# Patient Record
Sex: MALE | Race: BLACK OR AFRICAN AMERICAN | NOT HISPANIC OR LATINO | URBAN - METROPOLITAN AREA
[De-identification: names, ages, dates, MRNs, and addresses within clinical notes are randomized per-mention and may not be internally consistent; named-entity substitution may affect disease eponyms.]

---

## 2022-04-30 ENCOUNTER — TELEPHONE ENCOUNTER (OUTPATIENT)
Dept: URBAN - METROPOLITAN AREA CLINIC 121 | Facility: CLINIC | Age: 72
End: 2022-04-30

## 2022-05-01 ENCOUNTER — TELEPHONE ENCOUNTER (OUTPATIENT)
Dept: URBAN - METROPOLITAN AREA CLINIC 121 | Facility: CLINIC | Age: 72
End: 2022-05-01

## 2022-05-01 RX ORDER — TEA TREE OIL 100 %
SPRAY, NON-AEROSOL (ML) TOPICAL
Status: ACTIVE | COMMUNITY
Start: 2012-08-14

## 2022-05-01 RX ORDER — LISINOPRIL AND HYDROCHLOROTHIAZIDE TABLETS 10; 12.5 MG/1; MG/1
TABLET ORAL
Status: ACTIVE | COMMUNITY
Start: 2012-08-14

## 2022-05-01 RX ORDER — POLYETHYLENE GLYCOL 3350, SODIUM SULFATE, SODIUM CHLORIDE, POTASSIUM CHLORIDE, ASCORBIC ACID, SODIUM ASCORBATE 7.5-2.691G
MIX AND USE AS DIRECTED KIT ORAL
Status: ACTIVE | COMMUNITY
Start: 2012-08-14

## 2022-05-01 RX ORDER — CLOPIDOGREL BISULFATE 75 MG
TABLET ORAL
Status: ACTIVE | COMMUNITY
Start: 2012-08-14

## 2023-11-09 ENCOUNTER — OFFICE VISIT (OUTPATIENT)
Dept: URBAN - METROPOLITAN AREA CLINIC 29 | Facility: CLINIC | Age: 73
End: 2023-11-09
Payer: COMMERCIAL

## 2023-11-09 ENCOUNTER — LAB OUTSIDE AN ENCOUNTER (OUTPATIENT)
Dept: URBAN - METROPOLITAN AREA CLINIC 29 | Facility: CLINIC | Age: 73
End: 2023-11-09

## 2023-11-09 VITALS
HEART RATE: 78 BPM | BODY MASS INDEX: 35.42 KG/M2 | SYSTOLIC BLOOD PRESSURE: 165 MMHG | WEIGHT: 276 LBS | HEIGHT: 74 IN | DIASTOLIC BLOOD PRESSURE: 85 MMHG

## 2023-11-09 DIAGNOSIS — I10 ESSENTIAL (PRIMARY) HYPERTENSION: ICD-10-CM

## 2023-11-09 DIAGNOSIS — Z12.11 ENCOUNTER FOR SCREENING FOR MALIGNANT NEOPLASM OF COLON: ICD-10-CM

## 2023-11-09 DIAGNOSIS — E11.9 TYPE 2 DIABETES MELLITUS WITHOUT COMPLICATION, WITHOUT LONG-TERM CURRENT USE OF INSULIN: ICD-10-CM

## 2023-11-09 PROBLEM — 313436004: Status: ACTIVE | Noted: 2023-11-09

## 2023-11-09 PROCEDURE — 99204 OFFICE O/P NEW MOD 45 MIN: CPT | Performed by: INTERNAL MEDICINE

## 2023-11-09 RX ORDER — LISINOPRIL AND HYDROCHLOROTHIAZIDE TABLETS 10; 12.5 MG/1; MG/1
TABLET ORAL
Status: DISCONTINUED | COMMUNITY
Start: 2012-08-14

## 2023-11-09 RX ORDER — CLOPIDOGREL BISULFATE 75 MG
TABLET ORAL
Status: DISCONTINUED | COMMUNITY
Start: 2012-08-14

## 2023-11-09 RX ORDER — POLYETHYLENE GLYCOL 3350, SODIUM SULFATE, SODIUM CHLORIDE, POTASSIUM CHLORIDE, ASCORBIC ACID, SODIUM ASCORBATE 7.5-2.691G
MIX AND USE AS DIRECTED KIT ORAL
Status: DISCONTINUED | COMMUNITY
Start: 2012-08-14

## 2023-11-09 RX ORDER — SOD SULF/POT CHLORIDE/MAG SULF 1.479 G
12 TABLETS THE FIRST DOSE THE EVENING BEFORE AND SECOND DOSE THE MORNING OF COLONOSCOPY TABLET ORAL TWICE A DAY
Qty: 24 TABLET | OUTPATIENT
Start: 2023-11-09 | End: 2023-11-10

## 2023-11-09 RX ORDER — TEA TREE OIL 100 %
SPRAY, NON-AEROSOL (ML) TOPICAL
Status: DISCONTINUED | COMMUNITY
Start: 2012-08-14

## 2023-11-09 NOTE — HPI-TODAY'S VISIT:
Mr. Macias is a 73-year-old -American male who presents today per request by Dr. Robert for colon cancer screening.  He reports having a normal colonoscopy 12 years ago.  He denies any family history of colon cancer or polyps.  He denies any gastrointestinal issues.  His medical history is significant for hypertension, diabetes mellitus and coronary artery disease status post stent placement several years ago.

## 2023-11-14 ENCOUNTER — CLAIMS CREATED FROM THE CLAIM WINDOW (OUTPATIENT)
Dept: URBAN - METROPOLITAN AREA SURGERY CENTER 7 | Facility: SURGERY CENTER | Age: 73
End: 2023-11-14

## 2023-11-14 ENCOUNTER — CLAIMS CREATED FROM THE CLAIM WINDOW (OUTPATIENT)
Dept: URBAN - METROPOLITAN AREA SURGERY CENTER 7 | Facility: SURGERY CENTER | Age: 73
End: 2023-11-14
Payer: COMMERCIAL

## 2023-11-14 DIAGNOSIS — R19.5 STOOL CONTENTS FINDING, ABNORMAL: ICD-10-CM

## 2023-11-14 DIAGNOSIS — Z12.11 COLON CANCER SCREENING: ICD-10-CM

## 2023-11-14 DIAGNOSIS — Z53.8 FAILED ATTEMPTED SURGICAL PROCEDURE: ICD-10-CM

## 2023-11-14 DIAGNOSIS — Z12.11 COLON CANCER SCREENING (HIGH RISK): ICD-10-CM

## 2023-11-14 PROCEDURE — 00811 ANES LWR INTST NDSC NOS: CPT | Performed by: NURSE ANESTHETIST, CERTIFIED REGISTERED

## 2023-11-14 PROCEDURE — G0121 COLON CA SCRN NOT HI RSK IND: HCPCS | Performed by: INTERNAL MEDICINE

## 2023-11-14 PROCEDURE — G8907 PT DOC NO EVENTS ON DISCHARG: HCPCS | Performed by: INTERNAL MEDICINE

## 2023-11-28 ENCOUNTER — TELEPHONE ENCOUNTER (OUTPATIENT)
Dept: URBAN - METROPOLITAN AREA CLINIC 27 | Facility: CLINIC | Age: 73
End: 2023-11-28

## 2023-11-28 ENCOUNTER — CLAIMS CREATED FROM THE CLAIM WINDOW (OUTPATIENT)
Dept: URBAN - METROPOLITAN AREA SURGERY CENTER 7 | Facility: SURGERY CENTER | Age: 73
End: 2023-11-28
Payer: COMMERCIAL

## 2023-11-28 ENCOUNTER — LAB OUTSIDE AN ENCOUNTER (OUTPATIENT)
Dept: URBAN - METROPOLITAN AREA CLINIC 27 | Facility: CLINIC | Age: 73
End: 2023-11-28

## 2023-11-28 ENCOUNTER — CLAIMS CREATED FROM THE CLAIM WINDOW (OUTPATIENT)
Dept: URBAN - METROPOLITAN AREA CLINIC 4 | Facility: CLINIC | Age: 73
End: 2023-11-28
Payer: COMMERCIAL

## 2023-11-28 DIAGNOSIS — K52.3 COLITIS, INDETERMINATE: ICD-10-CM

## 2023-11-28 DIAGNOSIS — K63.89 APPENDICITIS EPIPLOICA: ICD-10-CM

## 2023-11-28 DIAGNOSIS — Z12.11 COLON CANCER SCREENING: ICD-10-CM

## 2023-11-28 DIAGNOSIS — K29.70 ERYTHEMATOUS GASTROPATHY: ICD-10-CM

## 2023-11-28 DIAGNOSIS — D12.2 ADENOMATOUS POLYP OF ASCENDING COLON: ICD-10-CM

## 2023-11-28 DIAGNOSIS — K52.3 INDETERMINATE COLITIS: ICD-10-CM

## 2023-11-28 DIAGNOSIS — K63.9 MUCOSAL ABNORMALITY OF COLON: ICD-10-CM

## 2023-11-28 DIAGNOSIS — Z12.11 COLON CANCER SCREENING (HIGH RISK): ICD-10-CM

## 2023-11-28 PROBLEM — 444548001: Status: ACTIVE | Noted: 2023-11-28

## 2023-11-28 PROCEDURE — G8907 PT DOC NO EVENTS ON DISCHARG: HCPCS | Performed by: INTERNAL MEDICINE

## 2023-11-28 PROCEDURE — 45380 COLONOSCOPY AND BIOPSY: CPT | Performed by: INTERNAL MEDICINE

## 2023-11-28 PROCEDURE — 00811 ANES LWR INTST NDSC NOS: CPT | Performed by: NURSE ANESTHETIST, CERTIFIED REGISTERED

## 2023-11-28 PROCEDURE — 88305 TISSUE EXAM BY PATHOLOGIST: CPT | Performed by: PATHOLOGY

## 2023-11-28 RX ORDER — CIPROFLOXACIN HYDROCHLORIDE 500 MG/1
1 TABLET TABLET, FILM COATED ORAL
Qty: 20 TABLET | OUTPATIENT
Start: 2023-11-28 | End: 2023-12-08

## 2023-11-28 RX ORDER — METRONIDAZOLE 500 MG/1
1 TABLET TABLET ORAL THREE TIMES A DAY
Qty: 30 TABLET | Refills: 0 | OUTPATIENT
Start: 2023-11-28 | End: 2023-12-08

## 2023-12-05 ENCOUNTER — TELEPHONE ENCOUNTER (OUTPATIENT)
Dept: URBAN - METROPOLITAN AREA CLINIC 27 | Facility: CLINIC | Age: 73
End: 2023-12-05

## 2023-12-07 ENCOUNTER — OFFICE VISIT (OUTPATIENT)
Dept: URBAN - METROPOLITAN AREA CLINIC 29 | Facility: CLINIC | Age: 73
End: 2023-12-07
Payer: COMMERCIAL

## 2023-12-07 VITALS
HEIGHT: 74 IN | HEART RATE: 64 BPM | SYSTOLIC BLOOD PRESSURE: 168 MMHG | DIASTOLIC BLOOD PRESSURE: 88 MMHG | BODY MASS INDEX: 35.55 KG/M2 | WEIGHT: 277 LBS

## 2023-12-07 DIAGNOSIS — K52.3 COLITIS, INDETERMINATE: ICD-10-CM

## 2023-12-07 PROCEDURE — 99214 OFFICE O/P EST MOD 30 MIN: CPT | Performed by: INTERNAL MEDICINE

## 2023-12-07 RX ORDER — CIPROFLOXACIN HYDROCHLORIDE 500 MG/1
1 TABLET TABLET, FILM COATED ORAL
Qty: 20 TABLET | Status: ACTIVE | COMMUNITY
Start: 2023-11-28 | End: 2023-12-08

## 2023-12-07 RX ORDER — MESALAMINE 1.2 G/1
2 TABLETS WITH A MEAL TABLET, DELAYED RELEASE ORAL TWICE DAILY
Qty: 120 TABLET | Refills: 5 | OUTPATIENT
Start: 2023-12-10 | End: 2024-06-07

## 2023-12-07 RX ORDER — METRONIDAZOLE 500 MG/1
1 TABLET TABLET ORAL THREE TIMES A DAY
Qty: 30 TABLET | Refills: 0 | Status: ACTIVE | COMMUNITY
Start: 2023-11-28 | End: 2023-12-08

## 2023-12-07 NOTE — HPI-TODAY'S VISIT:
Mr. Macias presents today to discuss his recent manage screening colonoscopy biopsy results.  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  He is currently asymptomatic.  Otherwise, he has no other GI complaints.

## 2023-12-08 LAB
CALPROTECTIN, FECAL: 15
CLOSTRIDIUM DIFFICILE: NOT DETECTED
LEUKOCYTES: (no result)
SALMONELLA AND SHIGELLA, CULTURE: (no result)
SHIGA TOXINS, EIA W/RFL TO E.COLI O157 CULTURE: (no result)
TOXIN A AND B: NOT DETECTED

## 2023-12-12 ENCOUNTER — LAB OUTSIDE AN ENCOUNTER (OUTPATIENT)
Dept: URBAN - METROPOLITAN AREA CLINIC 27 | Facility: CLINIC | Age: 73
End: 2023-12-12

## 2023-12-13 ENCOUNTER — LAB OUTSIDE AN ENCOUNTER (OUTPATIENT)
Dept: URBAN - METROPOLITAN AREA CLINIC 27 | Facility: CLINIC | Age: 73
End: 2023-12-13

## 2023-12-16 LAB
A/G RATIO: 1.8
ABSOLUTE BASOPHILS: 72
ABSOLUTE EOSINOPHILS: 242
ABSOLUTE LYMPHOCYTES: 2481
ABSOLUTE MONOCYTES: 473
ABSOLUTE NEUTROPHILS: 2233
ALBUMIN: 4.2
ALKALINE PHOSPHATASE: 155
ALT (SGPT): 20
ANCA SCREEN: (no result)
AST (SGOT): 21
ATYPICAL P ANCA TITER: (no result)
BASOPHILS: 1.3
BILIRUBIN, TOTAL: 0.6
BUN/CREATININE RATIO: (no result)
BUN: 13
C-REACTIVE PROTEIN, QUANT: 2.2
CALCIUM: 10.4
CARBON DIOXIDE, TOTAL: 29
CHLORIDE: 102
CREATININE: 0.93
EGFR: 88
EOSINOPHILS: 4.4
FERRITIN, SERUM: 86
GASCA: 30.2
GLOBULIN, TOTAL: 2.4
GLUCOSE: 82
HEMATOCRIT: 44.8
HEMOGLOBIN: 14.9
IRON BIND.CAP.(TIBC): 355
IRON SATURATION: 24
IRON: 86
LYMPHOCYTES: 45.1
MCH: 26.6
MCHC: 33.3
MCV: 80
MONOCYTES: 8.6
MPV: 10.5
MYELOPEROXIDASE ANTIBODY: <1
NEUTROPHILS: 40.6
PLATELET COUNT: 296
POTASSIUM: 4
PROTEIN, TOTAL: 6.6
PROTEINASE-3 ANTIBODY: <1
RDW: 13.8
RED BLOOD CELL COUNT: 5.6
SACCHAROMYCES CEREVISIAE AB (ASCA) (IGA): 10.8
SODIUM: 139
WHITE BLOOD CELL COUNT: 5.5

## 2023-12-17 LAB — CALPROTECTIN, FECAL: (no result)

## 2023-12-22 LAB — CALPROTECTIN, FECAL: 30

## 2024-01-25 ENCOUNTER — OFFICE VISIT (OUTPATIENT)
Dept: URBAN - METROPOLITAN AREA CLINIC 29 | Facility: CLINIC | Age: 74
End: 2024-01-25
Payer: COMMERCIAL

## 2024-01-25 VITALS
WEIGHT: 281 LBS | HEIGHT: 74 IN | DIASTOLIC BLOOD PRESSURE: 86 MMHG | BODY MASS INDEX: 36.06 KG/M2 | HEART RATE: 69 BPM | SYSTOLIC BLOOD PRESSURE: 156 MMHG

## 2024-01-25 DIAGNOSIS — K52.3 COLITIS, INDETERMINATE: ICD-10-CM

## 2024-01-25 PROBLEM — 235746007: Status: ACTIVE | Noted: 2023-12-10

## 2024-01-25 PROCEDURE — 99214 OFFICE O/P EST MOD 30 MIN: CPT | Performed by: INTERNAL MEDICINE

## 2024-01-25 RX ORDER — MESALAMINE 1.2 G/1
2 TABLETS WITH A MEAL TABLET, DELAYED RELEASE ORAL ONCE A DAY
Qty: 120 TABLET | Refills: 3 | OUTPATIENT
Start: 2024-01-25 | End: 2024-09-21

## 2024-01-25 RX ORDER — MESALAMINE 1.2 G/1
2 TABLETS WITH A MEAL TABLET, DELAYED RELEASE ORAL TWICE DAILY
Qty: 120 TABLET | Refills: 5 | Status: ACTIVE | COMMUNITY
Start: 2023-12-10 | End: 2024-06-07

## 2024-01-25 NOTE — HPI-TODAY'S VISIT:
Mr. Macias presents today for follow-up of his indeterminate colitis.  Interestingly, he has always been asymptomatic.  During his last visit, I prescribed Lialda which he reports there was some insurance issues and he has not taken the medication to date.  Otherwise, he has no other GI complaints.

## 2024-01-30 ENCOUNTER — TELEPHONE ENCOUNTER (OUTPATIENT)
Dept: URBAN - METROPOLITAN AREA CLINIC 27 | Facility: CLINIC | Age: 74
End: 2024-01-30

## 2024-01-31 ENCOUNTER — TELEPHONE ENCOUNTER (OUTPATIENT)
Dept: URBAN - METROPOLITAN AREA CLINIC 27 | Facility: CLINIC | Age: 74
End: 2024-01-31

## 2024-01-31 RX ORDER — SULFASALAZINE 500 MG/1
2 TABLETS TABLET ORAL ONCE A DAY
Qty: 180 TABLET | Refills: 5 | OUTPATIENT
Start: 2024-01-31 | End: 2025-07-24

## 2024-04-25 ENCOUNTER — OV EP (OUTPATIENT)
Dept: URBAN - METROPOLITAN AREA CLINIC 29 | Facility: CLINIC | Age: 74
End: 2024-04-25
Payer: COMMERCIAL

## 2024-04-25 VITALS
HEIGHT: 74 IN | DIASTOLIC BLOOD PRESSURE: 75 MMHG | HEART RATE: 67 BPM | SYSTOLIC BLOOD PRESSURE: 160 MMHG | WEIGHT: 272 LBS | BODY MASS INDEX: 34.91 KG/M2

## 2024-04-25 DIAGNOSIS — K52.3 COLITIS, INDETERMINATE: ICD-10-CM

## 2024-04-25 PROCEDURE — 99214 OFFICE O/P EST MOD 30 MIN: CPT | Performed by: INTERNAL MEDICINE

## 2024-04-25 RX ORDER — SULFASALAZINE 500 MG/1
2 TABLETS TABLET ORAL ONCE A DAY
Qty: 180 TABLET | Refills: 5 | Status: ACTIVE | COMMUNITY
Start: 2024-01-31 | End: 2025-07-24

## 2024-04-25 RX ORDER — MESALAMINE 1.2 G/1
2 TABLETS WITH A MEAL TABLET, DELAYED RELEASE ORAL ONCE A DAY
Qty: 120 TABLET | Refills: 3 | Status: ACTIVE | COMMUNITY
Start: 2024-01-25 | End: 2024-09-21

## 2024-04-25 RX ORDER — MESALAMINE 1.2 G/1
2 TABLETS WITH A MEAL TABLET, DELAYED RELEASE ORAL TWICE DAILY
Qty: 120 TABLET | Refills: 5 | Status: ACTIVE | COMMUNITY
Start: 2023-12-10 | End: 2024-06-07

## 2024-04-25 NOTE — HPI-TODAY'S VISIT:
Mr. Macias presents today for routine follow-up of his indeterminate colitis.  I performed a routine colonoscopy in November 2023 and he was noted to have pancolitis with pathology revealing indeterminate type.  It was suggested that it could be early IBD, NSAID induced colitis or ischemic pancolitis.  I empirically placed him on Mesalamine which he is currently taking Sulfasalazine 500 mg 1 tablet twice daily.  Prior to his colonoscopy, he was asymptomatic and he currently reports no significant change in his bowel habits, except that he is less constipated.  He reports occasionally seeing blood in his stool, but reports that he takes Plavix daily.  His labs last year also revealed positive Saccharomyces cerevisiae antibody and atypical p-ANCA titer.

## 2024-11-18 ENCOUNTER — CLAIMS CREATED FROM THE CLAIM WINDOW (OUTPATIENT)
Dept: URBAN - METROPOLITAN AREA CLINIC 4 | Facility: CLINIC | Age: 74
End: 2024-11-18
Payer: COMMERCIAL

## 2024-11-18 ENCOUNTER — OFFICE VISIT (OUTPATIENT)
Dept: URBAN - METROPOLITAN AREA SURGERY CENTER 7 | Facility: SURGERY CENTER | Age: 74
End: 2024-11-18
Payer: COMMERCIAL

## 2024-11-18 ENCOUNTER — CLAIMS CREATED FROM THE CLAIM WINDOW (OUTPATIENT)
Dept: URBAN - METROPOLITAN AREA SURGERY CENTER 7 | Facility: SURGERY CENTER | Age: 74
End: 2024-11-18

## 2024-11-18 DIAGNOSIS — K51.919 ULCERATIVE COLITIS, UNSPECIFIED WITH UNSPECIFIED COMPLICATIONS: ICD-10-CM

## 2024-11-18 DIAGNOSIS — K51.00 ULCERATIVE CHRONIC PANCOLITIS, WITHOUT COMPLICATIONS: ICD-10-CM

## 2024-11-18 PROCEDURE — 45380 COLONOSCOPY AND BIOPSY: CPT | Performed by: INTERNAL MEDICINE

## 2024-11-18 PROCEDURE — 88305 TISSUE EXAM BY PATHOLOGIST: CPT | Performed by: PATHOLOGY

## 2024-11-18 RX ORDER — SULFASALAZINE 500 MG/1
2 TABLETS TABLET ORAL ONCE A DAY
Qty: 180 TABLET | Refills: 5 | Status: ACTIVE | COMMUNITY
Start: 2024-01-31 | End: 2025-07-24